# Patient Record
Sex: MALE | Race: BLACK OR AFRICAN AMERICAN | URBAN - METROPOLITAN AREA
[De-identification: names, ages, dates, MRNs, and addresses within clinical notes are randomized per-mention and may not be internally consistent; named-entity substitution may affect disease eponyms.]

---

## 2022-05-18 ENCOUNTER — OFFICE VISIT (OUTPATIENT)
Dept: URBAN - METROPOLITAN AREA CLINIC 43 | Facility: CLINIC | Age: 47
End: 2022-05-18
Payer: MEDICARE

## 2022-05-18 DIAGNOSIS — H40.053 OCULAR HYPERTENSION, BILATERAL: Primary | ICD-10-CM

## 2022-05-18 PROCEDURE — 99204 OFFICE O/P NEW MOD 45 MIN: CPT | Performed by: OPTOMETRIST

## 2022-05-18 ASSESSMENT — INTRAOCULAR PRESSURE
OS: 21
OD: 20
OD: 21
OS: 24

## 2022-05-18 ASSESSMENT — KERATOMETRY
OS: 42.00
OD: 42.38

## 2022-05-18 NOTE — IMPRESSION/PLAN
Impression: Ocular hypertension, bilateral: H40.053. (possible mixed mechanism narrow angle) T-Max 24/26 from [address] [facility] office Plan: IOP: 20/21 on Lumigan, pt brought in outside records 2/2 cupping Previously on Lumigan QHS OU from [address] [facility] office Continue Lumigan QHS OU
RTC 3-4 weeks for IOP check with VF/RNFL/PACHS/GONIO

## 2022-05-26 ENCOUNTER — OFFICE VISIT (OUTPATIENT)
Dept: URBAN - METROPOLITAN AREA CLINIC 43 | Facility: CLINIC | Age: 47
End: 2022-05-26
Payer: MEDICARE

## 2022-05-26 PROCEDURE — 92083 EXTENDED VISUAL FIELD XM: CPT | Performed by: OPTOMETRIST

## 2022-05-26 PROCEDURE — 99213 OFFICE O/P EST LOW 20 MIN: CPT | Performed by: OPTOMETRIST

## 2022-05-26 PROCEDURE — 92133 CPTRZD OPH DX IMG PST SGM ON: CPT | Performed by: OPTOMETRIST

## 2022-05-26 PROCEDURE — 76514 ECHO EXAM OF EYE THICKNESS: CPT | Performed by: OPTOMETRIST

## 2022-05-26 PROCEDURE — 92020 GONIOSCOPY: CPT | Performed by: OPTOMETRIST

## 2022-05-26 ASSESSMENT — INTRAOCULAR PRESSURE
OS: 20
OD: 18

## 2022-05-26 NOTE — IMPRESSION/PLAN
Impression: Ocular hypertension, bilateral: H40.053. (possible mixed mechanism narrow angle) T-Max 24/26 from [address] [facility] office
pachs av g thick Plan: IOP: 18/20 on Lumigan qhs OU, pt brought in outside records, put on lumigan qhs by Dr. Liliane Holbrook 2/2 cupping 
gonio shows non-occludable angles HVF today: reliable, OD: clear field, OS: 1 superior defect, grossly normal
OCT RNFL today: OD: no thinning, OS: bdl sup thin
pt was recommended SLT by Dr. Liliane Holbrook due to potential poor compliance with gtts 2/2 bipolar disorder, dwp ok to proceed with SLT given open angles, ok to cont.  care with Dr. Liliane Holbrook